# Patient Record
(demographics unavailable — no encounter records)

---

## 2024-11-15 NOTE — DISCUSSION/SUMMARY
[de-identified] : 30 minutes was spent ordering tests, reviewing past office notes, examining the patient, discussing the clinical presentation and findings, communicating and explaining the diagnosis, ordering medication, providing orthopedic education and documenting the visit in the electronic medical record.  Patient presents today for evaluation of right lateral thoracic/hip discomfort.  She is known to have advanced osteoarthritis of the spine as well as scoliosis changes.  The pain is likely radicular in nature.  She does not have any pain of the hips with gentle range of motion in a seated position.  She is recommended spine evaluation.  She will perform activities as tolerated.  She is encouraged to ambulate with the use of a walker to avoid injury.  The patient will be seen back as needed.  All questions were answered to the patient's satisfaction.

## 2024-11-15 NOTE — END OF VISIT
[FreeTextEntry4] :  I, Douglas Rockwell, acted solely as a scribe for Dr. Nancy Barraza on 11/15/2024. [FreeTextEntry3] :  I, Nancy Barraza, on 11/15/2024 personally performed the services described in the documentation, reviewed the documentation recorded by the scribe in my presence, and it accurately and completely records my words and actions.

## 2024-11-15 NOTE — PHYSICAL EXAM
[de-identified] : The patient is conversive and in no apparent distress. The patient is alert and oriented to person, place, and time. Affect and mood appear normal. The head is normocephalic and atraumatic. Skin shows normal turgor with no evidence of eczema or psoriasis. No respiratory distress noted. Sensation grossly intact. MUSCULOSKELETAL:   SEE BELOW  In a seated position in a wheelchair, gentle range of motion does not elicit any hip discomfort.  The patient does have some sensitivity along the right lateral ribs and hip region.  Sensation distally is intact.  4+/5 motor strength of the hips, knees and feet.  There is some mild 0-1+ pitting edema of the left lower extremity.  There is some early venous stasis.  No active cellulitis.  No open wounds. [de-identified] : X-rays of the pelvis and single view of the right and left hips were reviewed. Implants with acetabular augments are in good position. No signs of loosening or fracture. No stem subsidence.

## 2024-11-15 NOTE — HISTORY OF PRESENT ILLNESS
[de-identified] : Patient presents today for evaluation of some right lateral hip pain.  The patient underwent staged hip arthroplasty in 2013.  She has not had any dislocations or infections were trauma to the hips.  She is known to have advanced scoliosis of the spine.  The patient is a limited ambulator.  She does ambulate with a walker at times.  She presents today in a wheelchair.  She is currently residing in a nursing facility.  She denies of any fevers or chills.  No hip dislocations are reported.  The patient reports of past COVID disease as well as myocardial infarction.  No TIA or CVA are reported.  She denies of any acute sensory changes.  She does report of lymphedema of the left lower extremity.  She has been seen by vascular.  She has also been seen by spine in the past for back pain.  She is been recommended corticosteroid injections but is not necessarily on board with injections.  Review of Systems- Constitutional: No fever or chills.  Cardiovascular: No orthopnea or chest pain Pulmonary: No shortness of breath.  GI: No nausea or vomiting or abdominal pain. Musculoskeletal: see HPI  Psychiatric: No anxiety and depression.